# Patient Record
Sex: MALE | Race: WHITE | NOT HISPANIC OR LATINO | Employment: FULL TIME | URBAN - METROPOLITAN AREA
[De-identification: names, ages, dates, MRNs, and addresses within clinical notes are randomized per-mention and may not be internally consistent; named-entity substitution may affect disease eponyms.]

---

## 2023-05-05 ENCOUNTER — TELEPHONE (OUTPATIENT)
Dept: UROLOGY | Facility: CLINIC | Age: 23
End: 2023-05-05

## 2023-05-05 DIAGNOSIS — N50.819 PAIN IN TESTICLE, UNSPECIFIED LATERALITY: Primary | ICD-10-CM

## 2023-05-05 NOTE — TELEPHONE ENCOUNTER
Saw patient was scheduled without triage  Called and spoke to patient  Patient stated he has left testicular pain intermittently for the last 2-3 weeks  Patient stated he cannot tell if there is a lump but does feel like the testicle is going upwards at times  Patient stated the left testicle is slightly inflamed but barely  Patient stated when he wears tight fitting briefs there is more discomfort  Patient does not have PCP  Educated patient on ER precautions if symptoms worsen prior to appointment  Reviewed with Kingsley Lemus in office, patient should have [address] [facility] prior to visit  Order was placed given symptoms  Patient ws given CS #  Patient was thankful for call and verbalized understanding

## 2023-05-05 NOTE — TELEPHONE ENCOUNTER
Patient returning phone call to schedule an appt  Clerical staff [facility]) was not available at the moment  Patient would like first appt available, anywhere